# Patient Record
Sex: MALE | Race: WHITE | NOT HISPANIC OR LATINO | Employment: FULL TIME | URBAN - METROPOLITAN AREA
[De-identification: names, ages, dates, MRNs, and addresses within clinical notes are randomized per-mention and may not be internally consistent; named-entity substitution may affect disease eponyms.]

---

## 2017-11-16 ENCOUNTER — OFFICE VISIT (OUTPATIENT)
Dept: SLEEP MEDICINE | Facility: HOSPITAL | Age: 42
End: 2017-11-16

## 2017-11-16 VITALS
OXYGEN SATURATION: 96 % | DIASTOLIC BLOOD PRESSURE: 80 MMHG | WEIGHT: 290 LBS | BODY MASS INDEX: 37.22 KG/M2 | SYSTOLIC BLOOD PRESSURE: 144 MMHG | HEART RATE: 69 BPM | HEIGHT: 74 IN

## 2017-11-16 DIAGNOSIS — G47.33 SEVERE OBSTRUCTIVE SLEEP APNEA: Primary | ICD-10-CM

## 2017-11-16 DIAGNOSIS — E66.9 OBESITY (BMI 30-39.9): ICD-10-CM

## 2017-11-16 PROCEDURE — 99213 OFFICE O/P EST LOW 20 MIN: CPT | Performed by: INTERNAL MEDICINE

## 2017-11-16 NOTE — PROGRESS NOTES
Subjective:     Chief Complaint:   Chief Complaint   Patient presents with   • Follow-up       HPI:    Moses Jeter is a 42 y.o. male here for follow-up of obstructive sleep apnea.    He seems to be well satisfied with his current auto CPAP therapy.  He is using a fullface mask.  He wonders about the use of a nasal mask due to some skin irritation and leak.    Further details are as follows:    Since last visit sleep problem has: remained the same  Currently using PAP: yes Hours of usage during the night: 6    Amount of sleep per night : 7 hours  Average length of time it takes to fall asleep : 10 minutes  Number of awakenings per night : 1     He feels fatigue (tiredness, exhaustion, lethargy) in the daytime even when not sleepy? Infrequently  He feels sleepy (or struggles to stay awake) in the daytime? Infrequently    [place] Scale scored as 9/24.    Type of mask: full face mask    He (since starting PAP or since last visit) has problems with the following:   Pressure from the mask 3 - Mild Problems  Skin irritation from the mask 5 - Moderate Problems  Mask coming off face 3 - Mild Problems  Air leaks from the mask 3 - Mild Problems  Dry mouth or throat 3 - Mild Problems  Nasal congestion 3 - Mild Problems    I reviewed his PAP download:  Average pressure: 9  Average AHI:  2  Average minutes in large leak per night: 5      Current medications are:   Current Outpatient Prescriptions:   •  albuterol (PROVENTIL) (2.5 MG/3ML) 0.083% nebulizer solution, , Disp: , Rfl:   •  azelastine (ASTELIN) 0.1 % nasal spray, , Disp: , Rfl:   •  DULERA 100-5 MCG/ACT inhaler, , Disp: , Rfl:   •  montelukast (SINGULAIR) 10 MG tablet, , Disp: , Rfl:   •  pantoprazole (PROTONIX) 40 MG EC tablet, , Disp: , Rfl:   •  PROAIR  (90 BASE) MCG/ACT inhaler, , Disp: , Rfl:   •  PROAIR RESPICLICK 108 (90 BASE) MCG/ACT inhaler, , Disp: , Rfl:   •  atorvastatin (LIPITOR) 40 MG tablet, , Disp: , Rfl: .      The patient's relevant past  medical, surgical, family and social history were reviewed and updated in Epic as appropriate.     ROS:    Review of Systems   Constitutional: Negative for fatigue.   HENT: Positive for congestion.    Respiratory: Positive for apnea.          Objective:    Physical Exam   Constitutional: He is oriented to person, place, and time. He appears well-developed and well-nourished.   HENT:   Head: Normocephalic and atraumatic.   Mouth/Throat: Oropharynx is clear and moist.   Class III airway   Neck: Neck supple. No thyromegaly present.   Cardiovascular: Normal rate and regular rhythm.  Exam reveals no gallop and no friction rub.    No murmur heard.  Pulmonary/Chest: Effort normal. No respiratory distress. He has no wheezes. He has no rales.   Musculoskeletal: He exhibits no edema.   Neurological: He is alert and oriented to person, place, and time.   Skin: Skin is warm and dry.   Psychiatric: He has a normal mood and affect. His behavior is normal.   Vitals reviewed.      Data:    Patient's PAP download was personally reviewed including raw data and results.    Assessment:    Problem List Items Addressed This Visit        Pulmonary Problems    Severe obstructive sleep apnea - Primary    Overview     Auto CPAP         Relevant Orders    PAP Therapy       Other    Obesity (BMI 30-39.9)          Acceptable treatment of his sleep apnea clinically based upon his download.  He is not fully satisfied with his mask and complains of some rash and skin irritation at times.  He wonders about a nasal mask.  I explained that to him and I think he would be a good candidate but only a trial of therapy will tell if this is right for him.  We discussed the various types of nasal masks and nasal pillow mask.  I think he should try this but if that isn't working he could try a mask liner with his current fullface mask to improve tolerance.    Plan:     1. Nasal versus nasal pillow mask  2. Potential use of a mask liner  3. No change in CPAP  settings  4. Continued efforts at weight loss  5. Routine follow-up      Discussed in detail with the patient.  He will call prior to his follow up visit for any new problems.    Signed by  Eric Duque MD

## 2018-12-18 ENCOUNTER — OFFICE VISIT (OUTPATIENT)
Dept: SLEEP MEDICINE | Facility: HOSPITAL | Age: 43
End: 2018-12-18

## 2018-12-18 VITALS
WEIGHT: 290 LBS | OXYGEN SATURATION: 95 % | SYSTOLIC BLOOD PRESSURE: 146 MMHG | HEIGHT: 74 IN | DIASTOLIC BLOOD PRESSURE: 64 MMHG | HEART RATE: 56 BPM | BODY MASS INDEX: 37.22 KG/M2

## 2018-12-18 DIAGNOSIS — G47.33 OSA (OBSTRUCTIVE SLEEP APNEA): Primary | ICD-10-CM

## 2018-12-18 PROCEDURE — 99213 OFFICE O/P EST LOW 20 MIN: CPT | Performed by: NURSE PRACTITIONER

## 2018-12-18 NOTE — PATIENT INSTRUCTIONS

## 2019-12-10 ENCOUNTER — OFFICE VISIT (OUTPATIENT)
Dept: SLEEP MEDICINE | Facility: HOSPITAL | Age: 44
End: 2019-12-10

## 2019-12-10 VITALS
SYSTOLIC BLOOD PRESSURE: 164 MMHG | DIASTOLIC BLOOD PRESSURE: 76 MMHG | WEIGHT: 303 LBS | HEART RATE: 52 BPM | BODY MASS INDEX: 38.89 KG/M2 | OXYGEN SATURATION: 96 % | HEIGHT: 74 IN

## 2019-12-10 DIAGNOSIS — G47.33 OSA (OBSTRUCTIVE SLEEP APNEA): Primary | ICD-10-CM

## 2019-12-10 PROCEDURE — 99213 OFFICE O/P EST LOW 20 MIN: CPT | Performed by: NURSE PRACTITIONER

## 2019-12-10 NOTE — PROGRESS NOTES
Chief Complaint:   Chief Complaint   Patient presents with   • Follow-up       HPI:    Moses Jeter is a 44 y.o. male here for follow-up of sleep apnea.  Patient was last seen 12/18/2018.  Patient states he is doing well with CPAP therapy.  Patient is sleeping 6 to 8 hours nightly and does feel very refreshed upon awakening.  Patient denies excessive daytime sleepiness.  Patient has an [place] score of 8/24.  Patient does wish to continue with his CPAP.  Patient's machine did break approximately 2 weeks ago and does need an order today for replacement.        Current medications are:   Current Outpatient Medications:   •  albuterol (PROVENTIL) (2.5 MG/3ML) 0.083% nebulizer solution, , Disp: , Rfl:   •  azelastine (ASTELIN) 0.1 % nasal spray, , Disp: , Rfl:   •  DULERA 100-5 MCG/ACT inhaler, , Disp: , Rfl:   •  montelukast (SINGULAIR) 10 MG tablet, , Disp: , Rfl:   •  pantoprazole (PROTONIX) 40 MG EC tablet, , Disp: , Rfl:   •  PROAIR RESPICLICK 108 (90 BASE) MCG/ACT inhaler, , Disp: , Rfl: .      The patient's relevant past medical, surgical, family and social history were reviewed and updated in Epic as appropriate.       Review of Systems   Eyes: Positive for visual disturbance.   Respiratory: Positive for apnea.    Psychiatric/Behavioral: Positive for sleep disturbance.   All other systems reviewed and are negative.        Objective:    Physical Exam   Constitutional: He is oriented to person, place, and time. He appears well-developed and well-nourished.   HENT:   Head: Normocephalic and atraumatic.   Mouth/Throat: Oropharynx is clear and moist.   Class 3 airway   Eyes: Conjunctivae are normal.   Neck: Neck supple. No thyromegaly present.   Cardiovascular: Normal rate and regular rhythm.   Pulmonary/Chest: Effort normal and breath sounds normal.   Lymphadenopathy:     He has no cervical adenopathy.   Neurological: He is alert and oriented to person, place, and time.   Skin: Skin is warm and dry.    Psychiatric: He has a normal mood and affect. His behavior is normal. Judgment and thought content normal.   Nursing note and vitals reviewed.  89/90 days of use.  Greater than 4-hour use 96.7%.  90% pressure 11.1.  AHI of 2.6.  Download reviewed with patient.      ASSESSMENT/PLAN    Moses was seen today for follow-up.    Diagnoses and all orders for this visit:    ILZET (obstructive sleep apnea)  -     CPAP Therapy            1. Counseled patient regarding multimodal approach with healthy nutrition, healthy sleep, regular physical activity, social activities, counseling, and medications. Encouraged to practice lateral  sleep position. Avoid alcohol and sedatives close to bedtime.  2. Order for new machine with settings 8 to 20 cm H2O faxed to DME of patient's choosing.  I will see patient back in 31 to 90 days to reassess    I have reviewed the results of my evaluation and impression and discussed my recommendations in detail with the patient.      Signed by  LIA Chapman    December 10, 2019      CC: Jose Angel Sharma MD          No ref. provider found

## 2020-02-11 ENCOUNTER — OFFICE VISIT (OUTPATIENT)
Dept: SLEEP MEDICINE | Facility: HOSPITAL | Age: 45
End: 2020-02-11

## 2020-02-11 VITALS
WEIGHT: 306 LBS | OXYGEN SATURATION: 96 % | SYSTOLIC BLOOD PRESSURE: 162 MMHG | BODY MASS INDEX: 38.05 KG/M2 | HEART RATE: 63 BPM | DIASTOLIC BLOOD PRESSURE: 72 MMHG | HEIGHT: 75 IN

## 2020-02-11 DIAGNOSIS — G47.33 OSA (OBSTRUCTIVE SLEEP APNEA): Primary | ICD-10-CM

## 2020-02-11 PROCEDURE — 99212 OFFICE O/P EST SF 10 MIN: CPT | Performed by: NURSE PRACTITIONER

## 2020-02-11 NOTE — PROGRESS NOTES
Chief Complaint:   Chief Complaint   Patient presents with   • Follow-up       HPI:    Moses Jeter is a 44 y.o. male here for follow-up of sleep apnea.  Patient was last seen 12/10/2019 and needed new machine.  Patient states he continues to do well with CPAP therapy.  Patient is sleeping 6 to 8 hours nightly and does feel refreshed upon awakening.  Patient denies any excessive daytime sleepiness.  Patient has an [place] score of 6/24.  Patient has no concerns or complaints today and wishes to continue CPAP.        Current medications are:   Current Outpatient Medications:   •  albuterol (PROVENTIL) (2.5 MG/3ML) 0.083% nebulizer solution, , Disp: , Rfl:   •  azelastine (ASTELIN) 0.1 % nasal spray, , Disp: , Rfl:   •  DULERA 100-5 MCG/ACT inhaler, , Disp: , Rfl:   •  montelukast (SINGULAIR) 10 MG tablet, , Disp: , Rfl:   •  pantoprazole (PROTONIX) 40 MG EC tablet, , Disp: , Rfl:   •  PROAIR RESPICLICK 108 (90 BASE) MCG/ACT inhaler, , Disp: , Rfl: .      The patient's relevant past medical, surgical, family and social history were reviewed and updated in Epic as appropriate.       Review of Systems   Eyes: Positive for visual disturbance.   Respiratory: Positive for apnea, shortness of breath and wheezing.    Gastrointestinal:        Heartburn   Psychiatric/Behavioral: Positive for sleep disturbance.   All other systems reviewed and are negative.        Objective:    Physical Exam   Constitutional: He is oriented to person, place, and time. He appears well-developed and well-nourished.   HENT:   Head: Normocephalic and atraumatic.   Mouth/Throat: Oropharynx is clear and moist.   Mallampati 3 anatomy   Eyes: Conjunctivae are normal.   Neck: Neck supple. No thyromegaly present.   Cardiovascular: Normal rate and regular rhythm.   Pulmonary/Chest: Effort normal and breath sounds normal.   Lymphadenopathy:     He has no cervical adenopathy.   Neurological: He is alert and oriented to person, place, and time.   Skin:  Skin is warm and dry.   Psychiatric: He has a normal mood and affect. His behavior is normal. Judgment and thought content normal.   Nursing note and vitals reviewed.    26/32 days of use.  Greater than 4-hour use 81.3%.  90% pressure 11.3.  AHI of 2.9.  Download reviewed with patient.    ASSESSMENT/PLAN    Moses was seen today for follow-up.    Diagnoses and all orders for this visit:    LIZET (obstructive sleep apnea)  -     CPAP Therapy            1. Counseled patient regarding multimodal approach with healthy nutrition, healthy sleep, regular physical activity, social activities, counseling, and medications. Encouraged to practice lateral sleep position. Avoid alcohol and sedatives close to bedtime.  2. Refill supplies x1 year.  Return to clinic 1 year or sooner symptoms warrant.  Settings remain 8 to 20 cm H2O.    I have reviewed the results of my evaluation and impression and discussed my recommendations in detail with the patient.      Signed by  LIA Chapman    February 11, 2020      CC: Jose Angel Sharma MD          No ref. provider found

## 2021-02-11 ENCOUNTER — TELEMEDICINE (OUTPATIENT)
Dept: SLEEP MEDICINE | Facility: HOSPITAL | Age: 46
End: 2021-02-11

## 2021-02-11 VITALS — BODY MASS INDEX: 39.49 KG/M2 | HEIGHT: 73 IN | WEIGHT: 298 LBS

## 2021-02-11 DIAGNOSIS — G47.33 OSA (OBSTRUCTIVE SLEEP APNEA): Primary | ICD-10-CM

## 2021-02-11 PROCEDURE — 99212 OFFICE O/P EST SF 10 MIN: CPT | Performed by: NURSE PRACTITIONER

## 2021-02-11 NOTE — PROGRESS NOTES
Chief Complaint:   Chief Complaint   Patient presents with   • Follow-up       HPI:    Moses Jeter is a 45 y.o. male here for follow-up of sleep apnea.  Patient was last seen 2 =11=20.  Patient states he continues to do well with CPAP therapy.  Patient is sleeping 8 hours nightly and feels refreshed upon awakening.  Patient will go to sleep and under 1 minute and does not get up during the night.  Patient has an [place] score of 5/24.  Patient has no concerns or complaints regarding CPAP and wishes to continue.        Current medications are:   Current Outpatient Medications:   •  albuterol (PROVENTIL) (2.5 MG/3ML) 0.083% nebulizer solution, , Disp: , Rfl:   •  azelastine (ASTELIN) 0.1 % nasal spray, , Disp: , Rfl:   •  montelukast (SINGULAIR) 10 MG tablet, , Disp: , Rfl:   •  pantoprazole (PROTONIX) 40 MG EC tablet, , Disp: , Rfl:   •  PROAIR RESPICLICK 108 (90 BASE) MCG/ACT inhaler, , Disp: , Rfl: .      The patient's relevant past medical, surgical, family and social history were reviewed and updated in Epic as appropriate.       Review of Systems   Respiratory: Positive for apnea, cough and shortness of breath.    Psychiatric/Behavioral: Positive for sleep disturbance.   All other systems reviewed and are negative.        Objective:    Physical Exam  Constitutional:       Appearance: Normal appearance.   HENT:      Head: Normocephalic and atraumatic.      Mouth/Throat:      Comments: Mallampati 3 anatomy  Skin:     General: Skin is dry.   Neurological:      Mental Status: He is alert and oriented to person, place, and time.   Psychiatric:         Mood and Affect: Mood normal.         Behavior: Behavior normal.         Thought Content: Thought content normal.         Judgment: Judgment normal.       26/30 days of use  Greater than 4-hour use 83.3  90% pressure 10.3  AHI of 2.8  Settings 8-20    ASSESSMENT/PLAN    Diagnoses and all orders for this visit:    1. LIZET (obstructive sleep apnea) (Primary)  -      CPAP Therapy            1. Counseled patient regarding multimodal approach with healthy nutrition, healthy sleep, regular physical activity, social activities, counseling, and medications. Encouraged to practice lateral sleep position. Avoid alcohol and sedatives close to bedtime.  2.   Refill supplies x1 year.  Return to clinic 1 year or sooner if symptoms warrant.  Patient gave verbal consent for video visit.  I have reviewed the results of my evaluation and impression and discussed my recommendations in detail with the patient.      Signed by  LIA Chapman    February 11, 2021      CC: Jose Angel Sharma MD          No ref. provider found

## 2021-12-06 ENCOUNTER — TELEMEDICINE (OUTPATIENT)
Dept: SLEEP MEDICINE | Facility: HOSPITAL | Age: 46
End: 2021-12-06

## 2021-12-06 VITALS — WEIGHT: 300 LBS | BODY MASS INDEX: 39.76 KG/M2 | HEIGHT: 73 IN

## 2021-12-06 DIAGNOSIS — G47.33 OSA (OBSTRUCTIVE SLEEP APNEA): Primary | ICD-10-CM

## 2021-12-06 PROCEDURE — 99213 OFFICE O/P EST LOW 20 MIN: CPT | Performed by: NURSE PRACTITIONER

## 2021-12-06 NOTE — PROGRESS NOTES
"    Chief Complaint:   Chief Complaint   Patient presents with   • Follow-up       HPI:    Moses Jeter is a 46 y.o. male here for follow-up of sleep apnea.  Patient was last seen 2/11/2021.  Patient states he does do well and \"I sleep like a baby.\"  When wearing his CPAP he does sleep 8 hours nightly feels rested upon awakening.  He goes to sleep quickly and does not get up during the night.  Patient has an [place] score of 5/24.  Patient states he was unable to turn on his machine 1 time in the night he did take his machine to his DME and was told they did not know what was wrong with the machine and he would need an order for a new one.  Hence, the visit today.  He does understand there may be a wait time due to the [employer] recall and getting a new machine.  We will have her get that forwarded over for him today.        Current medications are:   Current Outpatient Medications:   •  albuterol (PROVENTIL) (2.5 MG/3ML) 0.083% nebulizer solution, , Disp: , Rfl:   •  azelastine (ASTELIN) 0.1 % nasal spray, , Disp: , Rfl:   •  montelukast (SINGULAIR) 10 MG tablet, , Disp: , Rfl:   •  pantoprazole (PROTONIX) 40 MG EC tablet, , Disp: , Rfl:   •  PROAIR RESPICLICK 108 (90 BASE) MCG/ACT inhaler, , Disp: , Rfl: .      The patient's relevant past medical, surgical, family and social history were reviewed and updated in Epic as appropriate.       Review of Systems   Eyes: Positive for visual disturbance.   Respiratory: Positive for apnea.    Gastrointestinal:        Heartburn     Allergic/Immunologic: Positive for environmental allergies.   Psychiatric/Behavioral: Positive for sleep disturbance.         Objective:    Physical Exam  Constitutional:       Appearance: Normal appearance.   HENT:      Head: Normocephalic and atraumatic.      Mouth/Throat:      Comments: Mallampati 3 anatomy  Pulmonary:      Effort: Pulmonary effort is normal. No respiratory distress.   Neurological:      Mental Status: He is alert and oriented to " person, place, and time.   Psychiatric:         Mood and Affect: Mood normal.         Behavior: Behavior normal.         Thought Content: Thought content normal.         Judgment: Judgment normal.           ASSESSMENT/PLAN    Diagnoses and all orders for this visit:    1. LIZET (obstructive sleep apnea) (Primary)  -     CPAP Therapy            1. Counseled patient regarding multimodal approach with healthy nutrition, healthy sleep, regular physical activity, social activities, counseling, and medications. Encouraged to practice lateral sleep position. Avoid alcohol and sedatives close to bedtime.  2. Order faxed to DME for new machine I will see patient back in 31 to 90 days.  3. You have chosen to receive care through a telehealth visit.  Do you consent to use a video/audio connection for your medical care today? Yes  4.     I have reviewed the results of my evaluation and impression and discussed my recommendations in detail with the patient.      Signed by  LIA Chapman    December 6, 2021      CC: Jose Angel Sharma MD          No ref. provider found

## 2021-12-23 ENCOUNTER — LAB (OUTPATIENT)
Dept: LAB | Facility: HOSPITAL | Age: 46
End: 2021-12-23

## 2021-12-23 ENCOUNTER — OFFICE VISIT (OUTPATIENT)
Dept: NEUROLOGY | Facility: CLINIC | Age: 46
End: 2021-12-23

## 2021-12-23 VITALS
OXYGEN SATURATION: 96 % | TEMPERATURE: 97.7 F | SYSTOLIC BLOOD PRESSURE: 132 MMHG | HEART RATE: 67 BPM | BODY MASS INDEX: 41.35 KG/M2 | HEIGHT: 73 IN | WEIGHT: 312 LBS | DIASTOLIC BLOOD PRESSURE: 78 MMHG

## 2021-12-23 DIAGNOSIS — F41.9 ANXIETY AND DEPRESSION: ICD-10-CM

## 2021-12-23 DIAGNOSIS — R42 DIZZINESS: Primary | ICD-10-CM

## 2021-12-23 DIAGNOSIS — R47.89 WORD FINDING DIFFICULTY: ICD-10-CM

## 2021-12-23 DIAGNOSIS — F32.A ANXIETY AND DEPRESSION: ICD-10-CM

## 2021-12-23 DIAGNOSIS — R41.840 ATTENTION AND CONCENTRATION DEFICIT: ICD-10-CM

## 2021-12-23 PROBLEM — J45.909 MODERATE ASTHMA WITHOUT COMPLICATION: Status: ACTIVE | Noted: 2021-12-23

## 2021-12-23 LAB
AMMONIA BLD-SCNC: 32 UMOL/L (ref 16–60)
ERYTHROCYTE [SEDIMENTATION RATE] IN BLOOD: 12 MM/HR (ref 0–15)
FOLATE SERPL-MCNC: 14 NG/ML (ref 4.78–24.2)
TSH SERPL DL<=0.05 MIU/L-ACNC: 1.4 UIU/ML (ref 0.27–4.2)
VIT B12 BLD-MCNC: 702 PG/ML (ref 211–946)

## 2021-12-23 PROCEDURE — 85652 RBC SED RATE AUTOMATED: CPT

## 2021-12-23 PROCEDURE — 99214 OFFICE O/P EST MOD 30 MIN: CPT | Performed by: NURSE PRACTITIONER

## 2021-12-23 PROCEDURE — 82140 ASSAY OF AMMONIA: CPT

## 2021-12-23 PROCEDURE — 82607 VITAMIN B-12: CPT

## 2021-12-23 PROCEDURE — 84443 ASSAY THYROID STIM HORMONE: CPT

## 2021-12-23 PROCEDURE — 36415 COLL VENOUS BLD VENIPUNCTURE: CPT

## 2021-12-23 PROCEDURE — 82746 ASSAY OF FOLIC ACID SERUM: CPT

## 2021-12-23 RX ORDER — FLUTICASONE PROPIONATE 50 MCG
2 SPRAY, SUSPENSION (ML) NASAL DAILY
COMMUNITY

## 2021-12-23 RX ORDER — BUPROPION HYDROCHLORIDE 150 MG/1
150 TABLET ORAL EVERY MORNING
COMMUNITY
Start: 2021-12-06

## 2021-12-23 RX ORDER — LISINOPRIL AND HYDROCHLOROTHIAZIDE 20; 12.5 MG/1; MG/1
1 TABLET ORAL DAILY
COMMUNITY
Start: 2021-12-06
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
## 2021-12-28 ENCOUNTER — TELEPHONE (OUTPATIENT)
Dept: NEUROLOGY | Facility: CLINIC | Age: 46
End: 2021-12-28

## 2021-12-28 NOTE — TELEPHONE ENCOUNTER
Called 1x. No answer. I LVM informing patient all labs are normal. I provided my name and our office call back number.  -TMT

## 2021-12-28 NOTE — TELEPHONE ENCOUNTER
----- Message from Teressa Morton, JJ, APRN sent at 12/27/2021  6:10 PM EST -----  Please let him know labs are normal. Thyroid, vitamin B12, inflammation and ammonia all normal.

## 2022-01-20 ENCOUNTER — HOSPITAL ENCOUNTER (OUTPATIENT)
Dept: MRI IMAGING | Facility: HOSPITAL | Age: 47
Discharge: HOME | End: 2022-01-20
Admitting: NURSE PRACTITIONER

## 2022-01-20 DIAGNOSIS — R42 DIZZINESS: ICD-10-CM

## 2022-01-20 DIAGNOSIS — R47.89 WORD FINDING DIFFICULTY: ICD-10-CM

## 2022-01-20 PROCEDURE — 70553 MRI BRAIN STEM W/O & W/DYE: CPT

## 2022-01-20 PROCEDURE — A9577 INJ MULTIHANCE: HCPCS | Performed by: NURSE PRACTITIONER

## 2022-01-20 PROCEDURE — 0 GADOBENATE DIMEGLUMINE 529 MG/ML SOLUTION: Performed by: NURSE PRACTITIONER

## 2022-01-20 RX ADMIN — GADOBENATE DIMEGLUMINE 20 ML: 529 INJECTION, SOLUTION INTRAVENOUS at 19:12

## 2022-01-24 ENCOUNTER — TELEPHONE (OUTPATIENT)
Dept: NEUROLOGY | Facility: CLINIC | Age: 47
End: 2022-01-24

## 2022-01-24 NOTE — TELEPHONE ENCOUNTER
Called 1x. No answer. I LVM informing patient with my name, nature of call and our office call back number.   -TMT

## 2022-01-24 NOTE — TELEPHONE ENCOUNTER
----- Message from Teressa Morton, JJ, APRN sent at 1/21/2022  4:18 PM EST -----  Pls notify pt MRI is basically normal and shows no acute findings. 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 We can refer him to neurosurgery for evaluation if he would like a second opinion. It is unlikely that will recommend any treatment.

## 2022-01-24 NOTE — TELEPHONE ENCOUNTER
PT IS RETUNING SUKHDEEP PHONE CALL.  Caller: Moses Jeter    Relationship: Self    Best call back number: [phone]    What is the best time to reach you: PLEASE CALL AFTER11:30AM     Who are you requesting to speak with (clinical staff, provider,  specific staff member): SUKHDEEP    Do you know the name of the person who called: SUKHDEEP    What was the call regarding: MRI RESULTS    Do you require a callback: YES

## 2022-01-24 NOTE — TELEPHONE ENCOUNTER
Patient has been informed. He asked for time to process what I told him, and advised if interested, he would contact our office for that referral to NS. I told him, per Russell, MRI is basically normal and shows no acute findings. 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

## 2022-02-11 ENCOUNTER — TELEMEDICINE (OUTPATIENT)
Dept: SLEEP MEDICINE | Facility: HOSPITAL | Age: 47
End: 2022-02-11

## 2022-02-11 VITALS — HEIGHT: 73 IN | WEIGHT: 300 LBS | BODY MASS INDEX: 39.76 KG/M2

## 2022-02-11 DIAGNOSIS — G47.33 OSA (OBSTRUCTIVE SLEEP APNEA): Primary | ICD-10-CM

## 2022-02-11 PROCEDURE — 99213 OFFICE O/P EST LOW 20 MIN: CPT | Performed by: NURSE PRACTITIONER

## 2022-02-11 NOTE — PROGRESS NOTES
Chief Complaint:   Chief Complaint   Patient presents with   • Follow-up       HPI:    Moses Jeter is a 46 y.o. male here for follow-up of sleep apnea.  Patient was last seen 12/6/2021 and did need new machine at that time.  Patient states he is doing well with his new machine.  He is sleeping 8 hours nightly and does feel rested upon awakening.  He will go to sleep quickly and does get up x1.  Patient states he has an Marilla score of 5/24.  He is currently on a diet and has lost 14 pounds with a goal of 20.  He has no concerns or complaints and will continue CPAP.        Current medications are:   Current Outpatient Medications:   •  albuterol (PROVENTIL) (2.5 MG/3ML) 0.083% nebulizer solution, , Disp: , Rfl:   •  Breo Ellipta 200-25 MCG/INH inhaler, Inhale 1 puff Daily., Disp: , Rfl:   •  buPROPion XL (WELLBUTRIN XL) 150 MG 24 hr tablet, Take 150 mg by mouth Every Morning. 12/23/21- Has not started yet, Disp: , Rfl:   •  fluticasone (FLONASE) 50 MCG/ACT nasal spray, 2 sprays into the nostril(s) as directed by provider Daily., Disp: , Rfl:   •  lisinopril-hydrochlorothiazide (PRINZIDE,ZESTORETIC) 20-12.5 MG per tablet, Take 1 tablet by mouth Daily., Disp: , Rfl:   •  montelukast (SINGULAIR) 10 MG tablet, Take 10 mg by mouth Every Night., Disp: , Rfl:   •  pantoprazole (PROTONIX) 40 MG EC tablet, Take 40 mg by mouth Daily., Disp: , Rfl:   •  PROAIR RESPICLICK 108 (90 BASE) MCG/ACT inhaler, Inhale As Needed., Disp: , Rfl: .      The patient's relevant past medical, surgical, family and social history were reviewed and updated in Epic as appropriate.       Review of Systems   Eyes: Positive for visual disturbance.   Respiratory: Positive for apnea, shortness of breath and wheezing.    Gastrointestinal:        Heartburn   Allergic/Immunologic: Positive for environmental allergies.   Psychiatric/Behavioral: Positive for dysphoric mood and sleep disturbance. The patient is nervous/anxious.           Objective:    Physical Exam  Constitutional:       Appearance: Normal appearance.   HENT:      Head: Normocephalic and atraumatic.      Mouth/Throat:      Comments: Mallampati 3 anatomy  Pulmonary:      Effort: Pulmonary effort is normal. No respiratory distress.   Neurological:      Mental Status: He is alert and oriented to person, place, and time.   Psychiatric:         Mood and Affect: Mood normal.         Behavior: Behavior normal.         Thought Content: Thought content normal.         Judgment: Judgment normal.       30/30 days of use  Greater than 4-hour use 100%  90% pressure 9.3  AHI 3.7  Settings 8-20    ASSESSMENT/PLAN    Diagnoses and all orders for this visit:    1. LIZET (obstructive sleep apnea) (Primary)  -     CPAP Therapy            1. Counseled patient regarding multimodal approach with healthy nutrition, healthy sleep, regular physical activity, social activities, counseling, and medications. Encouraged to practice lateral sleep position. Avoid alcohol and sedatives close to bedtime.  Patient gave verbal consent today for video visit.  2.   Refill supplies x1 year.  Return to clinic 1 year or sooner symptoms warrant.  I have reviewed the results of my evaluation and impression and discussed my recommendations in detail with the patient.      Signed by  LIA Chapman    February 11, 2022      CC: Jose Angel Sharma MD          No ref. provider found

## 2023-03-03 ENCOUNTER — TELEMEDICINE (OUTPATIENT)
Dept: SLEEP MEDICINE | Facility: HOSPITAL | Age: 48
End: 2023-03-03
Payer: COMMERCIAL

## 2023-03-03 VITALS — BODY MASS INDEX: 36.96 KG/M2 | WEIGHT: 288 LBS | HEIGHT: 74 IN

## 2023-03-03 DIAGNOSIS — G47.33 OSA (OBSTRUCTIVE SLEEP APNEA): Primary | ICD-10-CM

## 2023-03-03 PROCEDURE — 99213 OFFICE O/P EST LOW 20 MIN: CPT | Performed by: NURSE PRACTITIONER

## 2023-03-03 NOTE — PROGRESS NOTES
Chief Complaint:   Chief Complaint   Patient presents with   • Follow-up       HPI:    Moses Jeter is a 47 y.o. male here for follow-up of sleep apnea.  Patient was last seen 2/11/2022.  Patient continues to do well with CPAP therapy.  Patient is sleeping 8 hours nightly and does feel rested upon awakening.  Patient goes to sleep quickly and does get up x1.  Patient has an [place] score of 5/24.  He continues to do well with standard tubing a full facemask.  He has no concerns or complaints and will continue therapy.        Current medications are:   Current Outpatient Medications:   •  albuterol (PROVENTIL) (2.5 MG/3ML) 0.083% nebulizer solution, , Disp: , Rfl:   •  buPROPion XL (WELLBUTRIN XL) 150 MG 24 hr tablet, Take 150 mg by mouth Every Morning. 12/23/21- Has not started yet, Disp: , Rfl:   •  fluticasone (FLONASE) 50 MCG/ACT nasal spray, 2 sprays into the nostril(s) as directed by provider Daily., Disp: , Rfl:   •  lisinopril-hydrochlorothiazide (PRINZIDE,ZESTORETIC) 20-12.5 MG per tablet, Take 1 tablet by mouth Daily., Disp: , Rfl:   •  montelukast (SINGULAIR) 10 MG tablet, Take 10 mg by mouth Every Night., Disp: , Rfl:   •  pantoprazole (PROTONIX) 40 MG EC tablet, Take 40 mg by mouth Daily., Disp: , Rfl: .      The patient's relevant past medical, surgical, family and social history were reviewed and updated in Epic as appropriate.       Review of Systems   Eyes: Positive for visual disturbance.   Respiratory: Positive for apnea, shortness of breath and wheezing.    Gastrointestinal:        Heartburn   Allergic/Immunologic: Positive for environmental allergies.   Psychiatric/Behavioral: Positive for dysphoric mood and sleep disturbance. The patient is nervous/anxious.    All other systems reviewed and are negative.        Objective:    Physical Exam  Constitutional:       Appearance: Normal appearance.   HENT:      Head: Normocephalic and atraumatic.      Mouth/Throat:      Comments: Class 3  airway  Pulmonary:      Effort: Pulmonary effort is normal. No respiratory distress.   Neurological:      Mental Status: He is alert.   Psychiatric:         Mood and Affect: Mood normal.         Behavior: Behavior normal.         Thought Content: Thought content normal.         Judgment: Judgment normal.         CPAP Report    77/90 days of use  Greater than 4-hour use 85.6  90% pressure 10.4  AHI of 3.7  Settings 8-20  The patient continues to use and benefit from CPAP therapy.    ASSESSMENT/PLAN    Diagnoses and all orders for this visit:    1. LIZET (obstructive sleep apnea) (Primary)  -     PAP Therapy        1. Counseled patient regarding multimodal approach with healthy nutrition, healthy sleep, regular physical activity, social activities, counseling, and medications. Encouraged to practice lateral sleep position. Avoid alcohol and sedatives close to bedtime.  2.     Refill supplies x1 year.  Return to clinic 1 year sooner symptoms warrant.  Patient gave consent for video visit.  I have reviewed the results of my evaluation and impression and discussed my recommendations in detail with the patient.      Signed by  LIA Chapman    March 3, 2023      CC: Jose Angel Sharma MD         No ref. provider found

## 2024-03-01 ENCOUNTER — TELEMEDICINE (OUTPATIENT)
Dept: SLEEP MEDICINE | Facility: CLINIC | Age: 49
End: 2024-03-01
Payer: COMMERCIAL

## 2024-03-01 VITALS — BODY MASS INDEX: 37.86 KG/M2 | WEIGHT: 295 LBS | HEIGHT: 74 IN

## 2024-03-01 DIAGNOSIS — G47.33 OSA (OBSTRUCTIVE SLEEP APNEA): Primary | ICD-10-CM

## 2024-03-01 PROCEDURE — 99213 OFFICE O/P EST LOW 20 MIN: CPT | Performed by: NURSE PRACTITIONER

## 2024-03-01 NOTE — PROGRESS NOTES
Chief Complaint:   Chief Complaint   Patient presents with    Follow-up       HPI:    Moses Jeter is a 48 y.o. male here for follow-up of sleep apnea.  Patient was last seen 3/3/2023.  Patient continues to do well with CPAP therapy.  Patient is sleeping 8 hours nightly and does feel rested upon awakening.  Patient goes to sleep quickly and does get up x 1.  Patient has an [place] score of 5/24.  Patient continues to do well with standard tubing and fullface mask.  Patient has no concerns or complaints today regarding CPAP and wishes to continue therapy.        Current medications are:   Current Outpatient Medications:     albuterol (PROVENTIL) (2.5 MG/3ML) 0.083% nebulizer solution, , Disp: , Rfl:     buPROPion XL (WELLBUTRIN XL) 150 MG 24 hr tablet, Take 150 mg by mouth Every Morning. 12/23/21- Has not started yet, Disp: , Rfl:     fluticasone (FLONASE) 50 MCG/ACT nasal spray, 2 sprays into the nostril(s) as directed by provider Daily., Disp: , Rfl:     lisinopril-hydrochlorothiazide (PRINZIDE,ZESTORETIC) 20-12.5 MG per tablet, Take 1 tablet by mouth Daily., Disp: , Rfl:     montelukast (SINGULAIR) 10 MG tablet, Take 10 mg by mouth Every Night., Disp: , Rfl:     pantoprazole (PROTONIX) 40 MG EC tablet, Take 40 mg by mouth Daily., Disp: , Rfl: .      The patient's relevant past medical, surgical, family and social history were reviewed and updated in Epic as appropriate.       Review of Systems   Eyes:  Positive for visual disturbance.   Respiratory:  Positive for apnea, shortness of breath and wheezing.    Allergic/Immunologic: Positive for environmental allergies.   Psychiatric/Behavioral:  Positive for dysphoric mood and sleep disturbance. The patient is nervous/anxious.    All other systems reviewed and are negative.        Objective:    Physical Exam  Constitutional:       Appearance: Normal appearance.   HENT:      Head: Normocephalic and atraumatic.      Mouth/Throat:      Comments: Class 3 airway  Pulmonary:       Effort: Pulmonary effort is normal. No respiratory distress.   Neurological:      Mental Status: He is alert and oriented to person, place, and time.   Psychiatric:         Mood and Affect: Mood normal.         Behavior: Behavior normal.         Thought Content: Thought content normal.         Judgment: Judgment normal.         CPAP Report  86/90 days of use  Greater than 4-hour use 95.6  90% pressure 10.5  AHI 2.3  Setting 8-20    The patient continues to use and benefit from CPAP therapy.    ASSESSMENT/PLAN    Diagnoses and all orders for this visit:    1. LIZET (obstructive sleep apnea) (Primary)  -     PAP Therapy        Counseled patient regarding multimodal approach with healthy nutrition, healthy sleep, regular physical activity, social activities, counseling, and medications. Encouraged to practice lateral sleep position. Avoid alcohol and sedatives close to bedtime.  Refill supplies x 1 year.  Return to clinic 1 year or sooner if symptoms warrant.    The patient is located in [place], [place]. The patient presents today for telehealth service.  This service was conducted via audio/video technology through a secure [employer] video visit connection through Epic.  This provider is located in [facility].  Patient stated they are in a secure environment for the session.  Patient's condition being diagnosed/treated is appropriate for telemedicine.  The provider identified himself as well as his credentials.  The patient, and/or patient's guardian, consent to be seen remotely, and when consent is given they understanding that the consent allows for patient identifiable information to be sent to a third-party as needed.  They may refuse to be seen remotely at any time.  The electronic data is encrypted and password protected, and the patient and/or guardian has been advised of the potential risk to privacy not withstanding such measures.  Patient identifiers used: Name and date of birth.   I have reviewed the  results of my evaluation and impression and discussed my recommendations in detail with the patient.      Signed by  Fe Gerber, APRN    March 1, 2024      CC: Jose Angel Sharma MD         No ref. provider found

## 2024-05-29 NOTE — PROGRESS NOTES
Subjective: Follow-up        Chief Complaint:   Chief Complaint   Patient presents with   • Follow-up       HPI:    Moses Jeter is a 43 y.o. male here for follow-up of gera.  Patient was last seen 11/16/17 and states he is doing very well CPAP therapy.  He has happy with current settings and wishes to continue his therapy.  He is sleeping 5-7 hours nightly and does feel refreshed upon awakening.  His [place] score is 7/24.      Current medications are:   Current Outpatient Medications:   •  albuterol (PROVENTIL) (2.5 MG/3ML) 0.083% nebulizer solution, , Disp: , Rfl:   •  azelastine (ASTELIN) 0.1 % nasal spray, , Disp: , Rfl:   •  DULERA 100-5 MCG/ACT inhaler, , Disp: , Rfl:   •  montelukast (SINGULAIR) 10 MG tablet, , Disp: , Rfl:   •  pantoprazole (PROTONIX) 40 MG EC tablet, , Disp: , Rfl:   •  PROAIR  (90 BASE) MCG/ACT inhaler, , Disp: , Rfl:   •  PROAIR RESPICLICK 108 (90 BASE) MCG/ACT inhaler, , Disp: , Rfl:   •  atorvastatin (LIPITOR) 40 MG tablet, , Disp: , Rfl: .      The patient's relevant past medical, surgical, family and social history were reviewed and updated in Epic as appropriate.       Review of Systems   Eyes: Positive for visual disturbance.   Respiratory: Positive for apnea.    Psychiatric/Behavioral: Positive for sleep disturbance.   All other systems reviewed and are negative.        Objective:    Physical Exam   Constitutional: He is oriented to person, place, and time. He appears well-developed and well-nourished.   HENT:   Head: Normocephalic and atraumatic.   Mouth/Throat: Oropharynx is clear and moist.   Mallampati 3 anatomy   Eyes: Conjunctivae are normal.   Neck: Neck supple. No thyromegaly present.   Cardiovascular: Normal rate and regular rhythm.   Pulmonary/Chest: Effort normal and breath sounds normal.   Lymphadenopathy:     He has no cervical adenopathy.   Neurological: He is alert and oriented to person, place, and time.   Skin: Skin is warm and dry.   Psychiatric: He has a  normal mood and affect. His behavior is normal. Judgment and thought content normal.   Nursing note and vitals reviewed.   download has been reviewed with patient.  Use 176/180 daysGreater than 4 hour usage 94.4%.  AHI 2.6.  90% pressure 11.3 cm H2O.      ASSESSMENT/PLAN    Moses was seen today for follow-up.    Diagnoses and all orders for this visit:    LIZET (obstructive sleep apnea)  -     CPAP Therapy            1. Counseled patient regarding multimodal approach with healthy nutrition, healthy sleep, regular physical activity, social activities, counseling, and medications. Encouraged to practice lateral sleep position. Avoid alcohol and sedatives close to bedtime.  2. Return to clinic one year or sooner if symptoms warrant.  Refill supplies ×1 year.    I have reviewed the results of my evaluation and impression and discussed my recommendations in detail with the patient.      Signed by  LIA Chapman    December 18, 2018      CC: Jose Angel Sharma MD          No ref. provider found       Unknown

## 2025-03-03 ENCOUNTER — TELEMEDICINE (OUTPATIENT)
Dept: SLEEP MEDICINE | Facility: CLINIC | Age: 50
End: 2025-03-03
Payer: COMMERCIAL

## 2025-03-03 VITALS — WEIGHT: 302 LBS | HEIGHT: 74 IN | BODY MASS INDEX: 38.76 KG/M2

## 2025-03-03 DIAGNOSIS — G47.33 OSA (OBSTRUCTIVE SLEEP APNEA): Primary | ICD-10-CM

## 2025-03-03 PROCEDURE — 99213 OFFICE O/P EST LOW 20 MIN: CPT | Performed by: NURSE PRACTITIONER

## 2025-03-03 NOTE — PROGRESS NOTES
Chief Complaint:   Chief Complaint   Patient presents with    Follow-up       HPI:    Moses Jeter is a 49 y.o. male here for follow-up of sleep apnea.  Patient was last seen 3/1/2024.  Patient continues to do well with CPAP therapy.  Patient is sleeping 8 hours nightly.  Patient goes to sleep quickly and does get up x 1.  Patient has an [place] score of 5/24.  Patient is doing well with standard tubing and fullface mask.  Patient has no concerns or complaints and will continue therapy.        Current medications are:   Current Outpatient Medications:     albuterol (PROVENTIL) (2.5 MG/3ML) 0.083% nebulizer solution, , Disp: , Rfl:     fluticasone (FLONASE) 50 MCG/ACT nasal spray, 2 sprays into the nostril(s) as directed by provider Daily., Disp: , Rfl:     lisinopril-hydrochlorothiazide (PRINZIDE,ZESTORETIC) 20-12.5 MG per tablet, Take 1 tablet by mouth Daily., Disp: , Rfl:     montelukast (SINGULAIR) 10 MG tablet, Take 10 mg by mouth Every Night., Disp: , Rfl:     pantoprazole (PROTONIX) 40 MG EC tablet, Take 40 mg by mouth Daily., Disp: , Rfl: .      The patient's relevant past medical, surgical, family and social history were reviewed and updated in Epic as appropriate.       Review of Systems   HENT:  Positive for congestion.    Eyes:  Positive for visual disturbance.   Respiratory:  Positive for apnea, shortness of breath and wheezing.    Allergic/Immunologic: Positive for environmental allergies.   Psychiatric/Behavioral:  Positive for sleep disturbance.    All other systems reviewed and are negative.        Objective:    Physical Exam  Constitutional:       Appearance: Normal appearance.   HENT:      Head: Normocephalic and atraumatic.      Mouth/Throat:      Comments: Mallampati 3 anatomy  Pulmonary:      Effort: Pulmonary effort is normal. No respiratory distress.   Neurological:      Mental Status: He is alert and oriented to person, place, and time.   Psychiatric:         Mood and Affect: Mood normal.     "     Behavior: Behavior normal.         Thought Content: Thought content normal.         Judgment: Judgment normal.       Ht 188 cm (74\")   Wt (!) 137 kg (302 lb)   BMI 38.77 kg/m²     CPAP Report    89/90 days of use  Greater than 4-hour use 97.8  90% pressure 12.1  AHI 1.7  Setting 8-20  The patient continues to use and benefit from CPAP therapy.    ASSESSMENT/PLAN    Diagnoses and all orders for this visit:    1. LIZET (obstructive sleep apnea) (Primary)  -     PAP Therapy        Counseled patient regarding multimodal approach with healthy nutrition, healthy sleep, regular physical activity, social activities, counseling, and medications. Encouraged to practice lateral sleep position. Avoid alcohol and sedatives close to bedtime.    Refill supplies x 1 year.  Return to clinic 1 year or sooner as symptoms warrant.  The patient is located in [facility] at work. The patient presents today for telehealth service.  This service was conducted via audio/video technology through a secure [employer] video visit connection through Epic.  This provider is located in [facility].  Patient stated they are in a secure environment for the session.  Patient's condition being diagnosed/treated is appropriate for telemedicine.  The provider identified himself as well as his credentials.  The patient, and/or patient's guardian, consent to be seen remotely, and when consent is given they understanding that the consent allows for patient identifiable information to be sent to a third-party as needed.  They may refuse to be seen remotely at any time.  The electronic data is encrypted and password protected, and the patient and/or guardian has been advised of the potential risk to privacy not withstanding such measures.  Patient identifiers used: Name and date of birth.   I have reviewed the results of my evaluation and impression and discussed my recommendations in detail with the patient.      Signed by  Fe Gerber, " APRN    March 3, 2025      CC: Jose Angel Sharma MD         No ref. provider found